# Patient Record
Sex: MALE | Race: WHITE | Employment: STUDENT | URBAN - NONMETROPOLITAN AREA
[De-identification: names, ages, dates, MRNs, and addresses within clinical notes are randomized per-mention and may not be internally consistent; named-entity substitution may affect disease eponyms.]

---
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
## 2020-01-09 ENCOUNTER — APPOINTMENT (OUTPATIENT)
Dept: GENERAL RADIOLOGY | Age: 20
End: 2020-01-09
Payer: COMMERCIAL

## 2020-01-09 ENCOUNTER — HOSPITAL ENCOUNTER (EMERGENCY)
Age: 20
Discharge: HOME | End: 2020-01-10
Payer: COMMERCIAL

## 2020-01-09 VITALS
BODY MASS INDEX: 21.2 KG/M2 | WEIGHT: 160 LBS | HEIGHT: 73 IN | SYSTOLIC BLOOD PRESSURE: 119 MMHG | OXYGEN SATURATION: 98 % | HEART RATE: 98 BPM | DIASTOLIC BLOOD PRESSURE: 70 MMHG | RESPIRATION RATE: 16 BRPM | TEMPERATURE: 99.3 F

## 2020-01-09 PROCEDURE — 73610 X-RAY EXAM OF ANKLE: CPT

## 2020-01-09 PROCEDURE — 99999 PR OFFICE/OUTPT VISIT,PROCEDURE ONLY: CPT | Performed by: NURSE PRACTITIONER

## 2020-01-09 PROCEDURE — 99283 EMERGENCY DEPT VISIT LOW MDM: CPT

## 2020-01-09 RX ORDER — IBUPROFEN 800 MG/1
800 TABLET ORAL 3 TIMES DAILY PRN
Qty: 30 TABLET | Refills: 0 | Status: SHIPPED | OUTPATIENT
Start: 2020-01-09

## 2020-01-09 ASSESSMENT — ENCOUNTER SYMPTOMS
ABDOMINAL PAIN: 0
NAUSEA: 0
SORE THROAT: 0
RHINORRHEA: 0
SHORTNESS OF BREATH: 0
VOMITING: 0
COUGH: 0
DIARRHEA: 0
CONSTIPATION: 0
TROUBLE SWALLOWING: 0

## 2020-01-09 ASSESSMENT — PAIN DESCRIPTION - LOCATION: LOCATION: ANKLE

## 2020-01-09 ASSESSMENT — PAIN DESCRIPTION - PAIN TYPE: TYPE: ACUTE PAIN

## 2020-01-09 ASSESSMENT — PAIN SCALES - GENERAL: PAINLEVEL_OUTOF10: 8

## 2020-01-09 ASSESSMENT — PAIN DESCRIPTION - ORIENTATION: ORIENTATION: LEFT;OUTER

## 2020-01-10 ASSESSMENT — PAIN DESCRIPTION - ORIENTATION: ORIENTATION: LEFT;OUTER

## 2020-01-10 ASSESSMENT — PAIN DESCRIPTION - LOCATION: LOCATION: ANKLE

## 2020-01-10 ASSESSMENT — PAIN SCALES - GENERAL: PAINLEVEL_OUTOF10: 6

## 2020-01-10 NOTE — ED PROVIDER NOTES
Diagnosis Date    Epigastric hernia     present since birth         SURGICAL HISTORY       Past Surgical History:   Procedure Laterality Date    HERNIA REPAIR  1/25/13    epigastric suture hernia repair         CURRENT MEDICATIONS       Previous Medications    No medications on file       ALLERGIES     Amoxicillin-pot clavulanate    FAMILY HISTORY       Family History   Problem Relation Age of Onset    Other Father         Guilliane-Beirre Syndrome    Cancer Paternal Grandmother     Cancer Paternal Grandfather           SOCIAL HISTORY       Social History     Socioeconomic History    Marital status: Single     Spouse name: None    Number of children: None    Years of education: None    Highest education level: None   Occupational History    None   Social Needs    Financial resource strain: None    Food insecurity:     Worry: None     Inability: None    Transportation needs:     Medical: None     Non-medical: None   Tobacco Use    Smoking status: Current Some Day Smoker    Smokeless tobacco: Never Used    Tobacco comment: Pt states occasionally   Substance and Sexual Activity    Alcohol use: Yes     Comment:  Occ    Drug use: No    Sexual activity: None   Lifestyle    Physical activity:     Days per week: None     Minutes per session: None    Stress: None   Relationships    Social connections:     Talks on phone: None     Gets together: None     Attends Rastafari service: None     Active member of club or organization: None     Attends meetings of clubs or organizations: None     Relationship status: None    Intimate partner violence:     Fear of current or ex partner: None     Emotionally abused: None     Physically abused: None     Forced sexual activity: None   Other Topics Concern    None   Social History Narrative    None       SCREENINGS             PHYSICAL EXAM    (up to 7 for level 4, 8 or more for level 5)     ED Triage Vitals [01/09/20 2253]   BP Temp Temp src Heart Rate Resp SpO2 Options  Sprain of anterior talofibular ligament of left ankle, initial encounter:   Diagnosis management comments: Evaluation and work-up here revealed no signs of emergent or life-threatening pathology that would necessitate admission for further work-up or management at this time. Antonino Lopez is felt to be stable for discharge home with return precautions for worsening of the condition or development of new concerning symptoms.  Patient was encouraged to follow-up with her primary care doctor in the appropriate timeframe.  Necessary prescriptions and information have been provided for treatment at home.  Patient voices understanding and agreement with the plan. Amount and/or Complexity of Data Reviewed  Tests in the radiology section of CPT®: ordered and reviewed    Patient Progress  Patient progress: stable        CONSULTS:  None    PROCEDURES:  Unless otherwise notedbelow, none     Procedures    FINAL IMPRESSION     1.  Sprain of anterior talofibular ligament of left ankle, initial encounter          DISPOSITION/PLAN   DISPOSITION Discharge - Pending Orders Complete 01/09/2020 11:36:03 PM      PATIENT REFERRED TO:  @FUP@    DISCHARGE MEDICATIONS:  New Prescriptions    IBUPROFEN (ADVIL;MOTRIN) 800 MG TABLET    Take 1 tablet by mouth 3 times daily as needed for Pain          (Please note that portions of this note were completed with a voice recognition program.  Efforts were made to edit the dictations butoccasionally words are mis-transcribed.)    RADHA Smith (electronically signed)  AttendingEmergency Physician         RADHA Smith  01/09/20 0077

## 2020-08-06 ENCOUNTER — OFFICE VISIT (OUTPATIENT)
Dept: INTERNAL MEDICINE | Facility: CLINIC | Age: 20
End: 2020-08-06

## 2020-08-06 VITALS
OXYGEN SATURATION: 100 % | BODY MASS INDEX: 21.34 KG/M2 | HEART RATE: 85 BPM | HEIGHT: 73 IN | DIASTOLIC BLOOD PRESSURE: 62 MMHG | TEMPERATURE: 98.7 F | SYSTOLIC BLOOD PRESSURE: 130 MMHG | WEIGHT: 161 LBS

## 2020-08-06 DIAGNOSIS — Z20.2 CHLAMYDIA CONTACT, UNTREATED: Primary | ICD-10-CM

## 2020-08-06 PROCEDURE — 99213 OFFICE O/P EST LOW 20 MIN: CPT | Performed by: INTERNAL MEDICINE

## 2020-08-06 RX ORDER — IBUPROFEN 800 MG/1
800 TABLET ORAL AS NEEDED
COMMUNITY
Start: 2020-01-09

## 2020-08-06 RX ORDER — AZITHROMYCIN 500 MG/1
1000 TABLET, FILM COATED ORAL DAILY
Qty: 2 TABLET | Refills: 0 | Status: SHIPPED | OUTPATIENT
Start: 2020-08-06

## 2020-08-06 NOTE — PROGRESS NOTES
Subjective   Nnamdi Cartwright is a 19 y.o. male.   Chief Complaint   Patient presents with   • STD EXPOSURE     WITHIN LAST 2 MONTHS        Patient sexual partner came in contact with chlamydia released was diagnosed with chlamydia and treated yesterday.  He is requesting evaluation he is totally asymptomatic       The following portions of the patient's history were reviewed and updated as appropriate: allergies, current medications, past family history, past medical history, past social history, past surgical history and problem list.    Review of Systems   Constitutional: Negative for activity change, appetite change, fatigue, fever, unexpected weight gain and unexpected weight loss.   HENT: Negative for swollen glands, trouble swallowing and voice change.    Eyes: Negative for blurred vision and visual disturbance.   Respiratory: Negative for cough and shortness of breath.    Cardiovascular: Negative for chest pain, palpitations and leg swelling.   Gastrointestinal: Negative for abdominal pain, constipation, diarrhea, nausea, vomiting and indigestion.   Endocrine: Negative for cold intolerance, heat intolerance, polydipsia and polyphagia.   Genitourinary: Negative for dysuria and frequency.   Musculoskeletal: Negative for arthralgias, back pain, joint swelling and neck pain.   Skin: Negative for color change, rash and skin lesions.   Neurological: Negative for dizziness, weakness, headache, memory problem and confusion.   Hematological: Does not bruise/bleed easily.   Psychiatric/Behavioral: Negative for agitation, hallucinations and suicidal ideas. The patient is not nervous/anxious.        Objective   No past medical history on file.   No past surgical history on file.     Current Outpatient Medications:   •  ibuprofen (ADVIL,MOTRIN) 800 MG tablet, Take 800 mg by mouth As Needed., Disp: , Rfl:   •  azithromycin (Zithromax) 500 MG tablet, Take 2 tablets by mouth Daily., Disp: 2 tablet, Rfl: 0     Vitals:     08/06/20 1011   BP: 130/62   Pulse: 85   Temp: 98.7 °F (37.1 °C)   SpO2: 100%         08/06/20  1011   Weight: 73 kg (161 lb)     Patient's Body mass index is 21.24 kg/m². BMI is .      Physical Exam   Constitutional: He is oriented to person, place, and time. He appears well-developed and well-nourished.   HENT:   Head: Normocephalic and atraumatic.   Right Ear: External ear normal.   Left Ear: External ear normal.   Nose: Nose normal.   Mouth/Throat: Oropharynx is clear and moist.   Eyes: Pupils are equal, round, and reactive to light. Conjunctivae and EOM are normal.   Neck: Normal range of motion. Neck supple. No thyromegaly present.   Cardiovascular: Normal rate, regular rhythm, normal heart sounds and intact distal pulses.   Pulmonary/Chest: Effort normal and breath sounds normal.   Abdominal: Soft. Bowel sounds are normal.   Lymphadenopathy:     He has no cervical adenopathy.   Neurological: He is alert and oriented to person, place, and time.   Skin: Skin is warm and dry.   Psychiatric: He has a normal mood and affect. His behavior is normal. Thought content normal.   Nursing note and vitals reviewed.            Assessment/Plan   Diagnoses and all orders for this visit:    1. Chlamydia contact, untreated (Primary)    Other orders  -     azithromycin (Zithromax) 500 MG tablet; Take 2 tablets by mouth Daily.  Dispense: 2 tablet; Refill: 0        At this point I am giving him thousand milligrams of azithromycin as a one-time dose for treatment of chlamydia.  We discussed potential for other sexual transmitted diseases including AIDS etc. I gave him the option of being evaluated for these he is desiring only treatment for the chlamydia that his sexual partner had and was treated for.  Therefore he is getting 1000 mg of azithromycin as a one-time dose.

## 2020-12-28 ENCOUNTER — OFFICE VISIT (OUTPATIENT)
Dept: URGENT CARE | Age: 20
End: 2020-12-28
Payer: COMMERCIAL

## 2020-12-28 VITALS
SYSTOLIC BLOOD PRESSURE: 124 MMHG | WEIGHT: 143 LBS | OXYGEN SATURATION: 99 % | RESPIRATION RATE: 18 BRPM | DIASTOLIC BLOOD PRESSURE: 74 MMHG | HEIGHT: 73 IN | TEMPERATURE: 98.2 F | HEART RATE: 82 BPM | BODY MASS INDEX: 18.95 KG/M2

## 2020-12-28 PROCEDURE — 99213 OFFICE O/P EST LOW 20 MIN: CPT | Performed by: NURSE PRACTITIONER

## 2020-12-28 RX ORDER — SULFAMETHOXAZOLE AND TRIMETHOPRIM 800; 160 MG/1; MG/1
1 TABLET ORAL 2 TIMES DAILY
Qty: 14 TABLET | Refills: 0 | Status: SHIPPED | OUTPATIENT
Start: 2020-12-28 | End: 2021-01-04

## 2020-12-28 ASSESSMENT — ENCOUNTER SYMPTOMS
RESPIRATORY NEGATIVE: 1
NAUSEA: 0
EYES NEGATIVE: 1
VOMITING: 0
ABDOMINAL PAIN: 0
RECTAL PAIN: 0
ANAL BLEEDING: 1
CONSTIPATION: 1
DIARRHEA: 1
BLOOD IN STOOL: 1
ABDOMINAL DISTENTION: 0

## 2020-12-28 NOTE — PATIENT INSTRUCTIONS
off.  ? For your safety, do not drive or operate any machinery that could be dangerous. Wait until the medicine wears off and you can think clearly and react easily. · Sit in a few inches of warm water (sitz bath) 3 times a day and after bowel movements. The warm water helps with pain and itching. · If your doctor prescribed antibiotics, take them exactly as directed. Do not stop taking them just because you feel better. You need to take the full course of antibiotics. · Follow your doctor's instructions if you were sent home with a drain or packing in the abscess. · Be safe with medicines. Take pain medicines exactly as directed. ? If the doctor gave you a prescription medicine for pain, take it as prescribed. ? If you are not taking a prescription pain medicine, ask your doctor if you can take an over-the-counter medicine. · Use stool softeners as directed. · Avoid scented and colored toilet paper, which may irritate the anal area. · Clean the area gently with wet cotton balls, a warm washcloth, baby wipes, or wipes such as Preparation H or Tucks. When should you call for help? Call 911 anytime you think you may need emergency care. For example, call if:    · You have trouble breathing.     · You passed out (lost consciousness). Call your doctor now or seek immediate medical care if:    · You have symptoms of infection, such as:  ? Increased pain, swelling, warmth, or redness. ? Red streaks leading from the area. ? Pus draining from the area. ? A fever. Watch closely for changes in your health, and be sure to contact your doctor if:    · You do not get better as expected. Where can you learn more? Go to [email or web address] and sign in to your [employer] account. Enter [phone] in the [facility] box to learn more about \"Perirectal Abscess: Care Instructions. \"     If you do not have an account, please click on the \"Sign Up Now\" link.   Current as of: April 15, 2020               Content Version: 12.6  © 6639-2148 [employer]. Care instructions adapted under license by [facility] [place] ([facility]). If you have questions about a medical condition or this instruction, always ask your healthcare professional. [address] any warranty or liability for your use of this information.

## 2020-12-28 NOTE — PROGRESS NOTES
[address] [place] [address]   [address], [address]     Phone:  [phone]  Fax:  [phone]      Hermila Liriano is a 21 y.o. male who presents today for his medical conditions/complaints as noted below. Hermila Liriano is c/o of Rectal Bleeding (Rectal Bleeding for one week)      Chief Complaint   Patient presents with    Rectal Bleeding     Rectal Bleeding for one week       HPI:     HPI    Hermila Liriano presents today for rectal tenderness and bleeding for 1 week. He states this area has been bleeding constantly in his underwear. He was previously constipated, but has had diarrhea the last few days. He states there is blood in the stool when he goes to the bathroom. Last bowel movement was last night and it was diarrhea. There was blood in the toilet. There is also blood when he wipes. His anus has previously been tender when he wipes with a large amount of blood yesterday and he could not stop the bleeding. He feels like this area has been bleeding constantly since yesterday. Currently, he is not having any pain or bleeding. He denies fevers or recent travel. He is concerned for hemorrhoids. He does not have a history of hemorrhoids. Past Medical History:   Diagnosis Date    Epigastric hernia     present since birth        Past Surgical History:   Procedure Laterality Date    HERNIA REPAIR  1/25/13    epigastric suture hernia repair       Social History     Tobacco Use    Smoking status: Current Some Day Smoker    Smokeless tobacco: Never Used    Tobacco comment: Pt states occasionally   Substance Use Topics    Alcohol use: Yes     Comment: Occ        Current Outpatient Medications   Medication Sig Dispense Refill    mupirocin (BACTROBAN) 2 % ointment Apply 3 times daily.  1 Tube 0    sulfamethoxazole-trimethoprim (BACTRIM DS;SEPTRA DS) 800-160 MG per tablet Take 1 tablet by mouth 2 times daily for 7 days 14 tablet 0    ibuprofen (ADVIL;MOTRIN) 800 MG tablet Take 1 tablet by mouth 3 times daily as needed for Pain (Patient not taking: Reported on 12/28/2020) 30 tablet 0     No current facility-administered medications for this visit. Allergies   Allergen Reactions    Amoxicillin-Pot Clavulanate        Family History   Problem Relation Age of Onset    Other Father         Guilliane-Beirre Syndrome    Cancer Paternal Grandmother     Cancer Paternal Grandfather                Review of Systems   Constitutional: Negative for fever. HENT: Negative. Eyes: Negative. Respiratory: Negative. Cardiovascular: Negative. Gastrointestinal: Positive for anal bleeding, blood in stool, constipation and diarrhea. Negative for abdominal distention, abdominal pain, nausea, rectal pain and vomiting. Genitourinary: Negative for decreased urine volume, dysuria, frequency, hematuria, penile pain, penile swelling, testicular pain and urgency. Musculoskeletal: Negative. Objective:     Physical Exam  Vitals signs and nursing note reviewed. Exam conducted with a chaperone present [facility] ROSEMARY Mckeon present during exam). Constitutional:       General: He is not in acute distress. Appearance: Normal appearance. He is normal weight. He is not ill-appearing, toxic-appearing or diaphoretic. HENT:      Head: Normocephalic and atraumatic. Right Ear: External ear normal.      Left Ear: External ear normal.      Nose: Nose normal. No rhinorrhea. Eyes:      General:         Right eye: No discharge. Left eye: No discharge. Conjunctiva/sclera: Conjunctivae normal.   Neck:      Musculoskeletal: Normal range of motion and neck supple. Cardiovascular:      Rate and Rhythm: Normal rate. Pulmonary:      Effort: Pulmonary effort is normal. No respiratory distress. Genitourinary:      Musculoskeletal: Normal range of motion. Skin:     Capillary Refill: Capillary refill takes less than 2 seconds.    Neurological:      Mental Status: He is alert and oriented to person, place, and time. Mental status is at baseline. Psychiatric:         Mood and Affect: Mood normal.         Behavior: Behavior normal.         /74   Pulse 82   Temp 98.2 °F (36.8 °C) (Temporal)   Resp 18   Ht 6' 1\" (1.854 m)   Wt 143 lb (64.9 kg)   HC 18 cm (7.09\")   SpO2 99%   BMI 18.87 kg/m²     Assessment:      Diagnosis Orders   1. Perianal abscess  CBC Auto Differential    mupirocin (BACTROBAN) 2 % ointment    sulfamethoxazole-trimethoprim (BACTRIM DS;SEPTRA DS) 800-160 MG per tablet   2. Rectal bleeding  CBC Auto Differential       No results found for this visit on 12/28/20. Plan:     VSS    If no improvement, consider GI referral.     CBC today, will call with results. Start bactroban and Bactrim    Keep area clean and dry    Follow-up if symptoms worsen or do not improve. Return if symptoms worsen or fail to improve. Orders Placed This Encounter   Procedures    CBC Auto Differential       Orders Placed This Encounter   Medications    mupirocin (BACTROBAN) 2 % ointment     Sig: Apply 3 times daily. Dispense:  1 Tube     Refill:  0    sulfamethoxazole-trimethoprim (BACTRIM DS;SEPTRA DS) 800-160 MG per tablet     Sig: Take 1 tablet by mouth 2 times daily for 7 days     Dispense:  14 tablet     Refill:  0        Patient offered educational materials - see patient instructions for any instruction needed. Discussed use, benefit, and side effects of prescribed medications. All patient questions answered. Instructed to continue current medications, diet and exercise. Patient agreed with treatment plan. Follow up as directed. Patient was advised to go to the ED if condition ever becomes emergent.        Electronically signed by RADHA Dickens on 12/28/2020 at 10:58 AM

## 2021-05-01 ENCOUNTER — OFFICE VISIT (OUTPATIENT)
Dept: URGENT CARE | Age: 21
End: 2021-05-01
Payer: COMMERCIAL

## 2021-05-01 VITALS
RESPIRATION RATE: 18 BRPM | SYSTOLIC BLOOD PRESSURE: 110 MMHG | WEIGHT: 152 LBS | DIASTOLIC BLOOD PRESSURE: 67 MMHG | TEMPERATURE: 97.8 F | HEART RATE: 88 BPM | OXYGEN SATURATION: 100 % | BODY MASS INDEX: 20.05 KG/M2

## 2021-05-01 DIAGNOSIS — B86 SCABIES: Primary | ICD-10-CM

## 2021-05-01 PROCEDURE — 99213 OFFICE O/P EST LOW 20 MIN: CPT | Performed by: NURSE PRACTITIONER

## 2021-05-01 RX ORDER — PERMETHRIN 50 MG/G
CREAM TOPICAL
Qty: 1 TUBE | Refills: 1 | Status: SHIPPED | OUTPATIENT
Start: 2021-05-01

## 2021-05-01 ASSESSMENT — ENCOUNTER SYMPTOMS
ALLERGIC/IMMUNOLOGIC NEGATIVE: 1
EYES NEGATIVE: 1

## 2021-05-01 ASSESSMENT — VISUAL ACUITY: OU: 1

## 2021-05-01 NOTE — PROGRESS NOTES
[address] [facility]  [address] [address] [place] [address]  Dept: [phone]  Dept Fax: [phone]  Loc: [phone]    Lauren Dowell is a 21 y.o. male who presents today for his medical conditions/complaintsas noted below. Lauren Dowell is c/o of Rash        HPI:     Rash  This is a new problem. The current episode started in the past 7 days. The problem has been gradually worsening since onset. The affected locations include the left arm, left foot, right foot and right toes (Left hand and arm, left foot, right foot). The rash is characterized by itchiness and dryness. It is unknown if there was an exposure to a precipitant. Past treatments include nothing. The treatment provided no relief. Celestina Feliz works as  and developed a rash on his left hand and up his left arm about a week ago, now he has the rash on the top of his left foot and it is spreading to the top of the right foot. Past Medical History:   Diagnosis Date    Epigastric hernia     present since birth     Past Surgical History:   Procedure Laterality Date    HERNIA REPAIR  1/25/13    epigastric suture hernia repair       Family History   Problem Relation Age of Onset    Other Father         Guilliane-Beirre Syndrome    Cancer Paternal Grandmother     Cancer Paternal Grandfather        Social History     Tobacco Use    Smoking status: Current Some Day Smoker    Smokeless tobacco: Never Used    Tobacco comment: Pt states occasionally   Substance Use Topics    Alcohol use: Yes     Comment:  Occ      Current Outpatient Medications   Medication Sig Dispense Refill    permethrin (ELIMITE) 5 % cream Apply topically as directed- apply chin to toes and wash off in 8-12 hrs, repeat in 1 wk 1 Tube 1    ibuprofen (ADVIL;MOTRIN) 800 MG tablet Take 1 tablet by mouth 3 times daily as needed for Pain (Patient not taking: Reported on 12/28/2020) 30 tablet 0     No current facility-administered medications for this visit. Allergies   Allergen Reactions    Amoxicillin-Pot Clavulanate        Health Maintenance   Topic Date Due    Hepatitis C screen  Never done    Varicella vaccine (1 of 2 - 2-dose childhood series) Never done    Pneumococcal 0-64 years Vaccine (1 of 1 - PPSV23) Never done    HPV vaccine (1 - Male 2-dose series) Never done    HIV screen  Never done    COVID-19 Vaccine (1) Never done    DTaP/Tdap/Td vaccine (1 - Tdap) Never done    Flu vaccine (Season Ended) 09/01/2021    Hepatitis A vaccine  Aged Out    Hepatitis B vaccine  Aged Out    Hib vaccine  Aged Out    Meningococcal (ACWY) vaccine  Aged Out       Subjective:     Review of Systems   Constitutional: Negative for activity change. Eyes: Negative. Cardiovascular: Negative. Skin: Positive for rash. Allergic/Immunologic: Negative. Neurological: Negative for headaches.       :Objective      Physical Exam  Vitals signs and nursing note reviewed. Constitutional:       General: He is awake. He is not in acute distress. Appearance: Normal appearance. He is well-developed and well-groomed. He is not ill-appearing or toxic-appearing. HENT:      Head: Normocephalic. Right Ear: Hearing normal.      Left Ear: Hearing normal.      Nose: Nose normal.      Mouth/Throat:      Lips: Pink. Eyes:      General: Vision grossly intact. Neck:      Musculoskeletal: Neck supple. Trachea: Phonation normal.   Cardiovascular:      Rate and Rhythm: Normal rate and regular rhythm. Pulmonary:      Effort: Pulmonary effort is normal. No respiratory distress. Abdominal:      Palpations: Abdomen is soft. Musculoskeletal:         General: No tenderness or deformity. Skin:     General: Skin is warm and dry. Capillary Refill: Capillary refill takes less than 2 seconds. Findings: Rash present. Rash is macular and papular.              Comments: Maculopapular rash to left hand and webs of fingers an up left arm in tracks, areas of rash to dorsum or left and right foot, rash also between toes of left foot,dorsum of left foot is slightly red and excoriated with areas of scabbing noted   Neurological:      General: No focal deficit present. Mental Status: He is alert, oriented to person, place, and time and easily aroused. Psychiatric:         Attention and Perception: Attention normal.         Mood and Affect: Mood normal.         Speech: Speech normal.         Behavior: Behavior normal. Behavior is cooperative. /67   Pulse 88   Temp 97.8 °F (36.6 °C) (Temporal)   Resp 18   Wt 152 lb (68.9 kg)   SpO2 100%   BMI 20.05 kg/m²     :Assessment       Diagnosis Orders   1. Scabies         :Plan    No orders of the defined types were placed in this encounter. Return if symptoms worsen or fail to improve. Orders Placed This Encounter   Medications    permethrin (ELIMITE) 5 % cream     Sig: Apply topically as directed- apply chin to toes and wash off in 8-12 hrs, repeat in 1 wk     Dispense:  1 Tube     Refill:  1        Patient Instructions     Use Elimite as directed, repeat treatment in 1 week  Wash linens in hottest water and dry in hottest dryer setting  Throw pillows on the bed tie up in a plastic bag and leave sealed up x 1 week  Follow up with PCP or return to Urgent Care for worsening or unresolved symptoms. Patient Education        Scabies: Care Instructions  Overview  Scabies is a skin problem that can cause a rash and intense itching. It is caused by very tiny bugs called mites that dig just under the skin and lay eggs. An allergic reaction to the mites causes the itching. Scabies is usually spread by person-to-person contact. It is also possible, but not common, for scabies to spread through towels, clothes, and bedding. Everyone in your household should be treated. Scabies is treated with medicine.  Itching may last for several weeks after 2020               Content Version: 12.8  © 2006-2021 [employer]. Care instructions adapted under license by [facility] [place] ([facility]). If you have questions about a medical condition or this instruction, always ask your healthcare professional. [address] [address] any warranty or liability for your use of this information. Patient given educational materials- see patient instructions. Discussed use, benefit, and side effects of prescribedmedications. All patient questions answered. Pt voiced understanding.        Electronically signed by RADHA Rondon - CNP on 5/1/2021 at 8:49 AM

## 2021-05-04 ENCOUNTER — HOSPITAL ENCOUNTER (EMERGENCY)
Age: 21
Discharge: HOME | End: 2021-05-04
Payer: COMMERCIAL

## 2021-05-04 ENCOUNTER — OFFICE VISIT (OUTPATIENT)
Dept: URGENT CARE | Age: 21
End: 2021-05-04
Payer: COMMERCIAL

## 2021-05-04 VITALS
SYSTOLIC BLOOD PRESSURE: 147 MMHG | TEMPERATURE: 97 F | DIASTOLIC BLOOD PRESSURE: 73 MMHG | HEART RATE: 85 BPM | WEIGHT: 155 LBS | BODY MASS INDEX: 20.45 KG/M2 | RESPIRATION RATE: 16 BRPM | OXYGEN SATURATION: 97 %

## 2021-05-04 VITALS
RESPIRATION RATE: 16 BRPM | OXYGEN SATURATION: 100 % | TEMPERATURE: 98.3 F | HEART RATE: 77 BPM | SYSTOLIC BLOOD PRESSURE: 132 MMHG | DIASTOLIC BLOOD PRESSURE: 70 MMHG

## 2021-05-04 DIAGNOSIS — L30.9 DERMATITIS: Primary | ICD-10-CM

## 2021-05-04 DIAGNOSIS — B86 SCABIES: Primary | ICD-10-CM

## 2021-05-04 DIAGNOSIS — L08.9 LOCALIZED BACTERIAL INFECTION OF SKIN: ICD-10-CM

## 2021-05-04 DIAGNOSIS — L03.116 CELLULITIS OF LEFT LEG: ICD-10-CM

## 2021-05-04 DIAGNOSIS — B96.89 LOCALIZED BACTERIAL INFECTION OF SKIN: ICD-10-CM

## 2021-05-04 DIAGNOSIS — Z77.098 CHEMICAL EXPOSURE: ICD-10-CM

## 2021-05-04 PROCEDURE — 96372 THER/PROPH/DIAG INJ SC/IM: CPT

## 2021-05-04 PROCEDURE — 6370000000 HC RX 637 (ALT 250 FOR IP): Performed by: NURSE PRACTITIONER

## 2021-05-04 PROCEDURE — 99283 EMERGENCY DEPT VISIT LOW MDM: CPT

## 2021-05-04 PROCEDURE — 6360000002 HC RX W HCPCS: Performed by: NURSE PRACTITIONER

## 2021-05-04 PROCEDURE — 99213 OFFICE O/P EST LOW 20 MIN: CPT | Performed by: NURSE PRACTITIONER

## 2021-05-04 RX ORDER — DEXAMETHASONE SODIUM PHOSPHATE 10 MG/ML
8 INJECTION, SOLUTION INTRAMUSCULAR; INTRAVENOUS ONCE
Status: COMPLETED | OUTPATIENT
Start: 2021-05-04 | End: 2021-05-04

## 2021-05-04 RX ORDER — IVERMECTIN 3 MG/1
TABLET ORAL
Qty: 8 TABLET | Refills: 0 | Status: SHIPPED | OUTPATIENT
Start: 2021-05-04

## 2021-05-04 RX ORDER — DIPHENHYDRAMINE HYDROCHLORIDE 50 MG/ML
50 INJECTION INTRAMUSCULAR; INTRAVENOUS ONCE
Status: COMPLETED | OUTPATIENT
Start: 2021-05-04 | End: 2021-05-04

## 2021-05-04 RX ORDER — SULFAMETHOXAZOLE AND TRIMETHOPRIM 800; 160 MG/1; MG/1
1 TABLET ORAL 2 TIMES DAILY
Qty: 20 TABLET | Refills: 0 | Status: SHIPPED | OUTPATIENT
Start: 2021-05-04 | End: 2021-05-04

## 2021-05-04 RX ORDER — CEPHALEXIN 500 MG/1
500 CAPSULE ORAL 3 TIMES DAILY
Qty: 30 CAPSULE | Refills: 0 | Status: SHIPPED | OUTPATIENT
Start: 2021-05-04 | End: 2021-05-14

## 2021-05-04 RX ORDER — FAMOTIDINE 20 MG/1
20 TABLET, FILM COATED ORAL ONCE
Status: COMPLETED | OUTPATIENT
Start: 2021-05-04 | End: 2021-05-04

## 2021-05-04 RX ADMIN — FAMOTIDINE 20 MG: 20 TABLET ORAL at 17:11

## 2021-05-04 RX ADMIN — DEXAMETHASONE SODIUM PHOSPHATE 8 MG: 10 INJECTION, SOLUTION INTRAMUSCULAR; INTRAVENOUS at 17:11

## 2021-05-04 RX ADMIN — DIPHENHYDRAMINE HYDROCHLORIDE 50 MG: 50 INJECTION, SOLUTION INTRAMUSCULAR; INTRAVENOUS at 17:11

## 2021-05-04 ASSESSMENT — ENCOUNTER SYMPTOMS
COLOR CHANGE: 1
ALLERGIC/IMMUNOLOGIC NEGATIVE: 1
NAUSEA: 0
VOMITING: 0
DIARRHEA: 0
ABDOMINAL PAIN: 0
COLOR CHANGE: 1

## 2021-05-04 ASSESSMENT — VISUAL ACUITY: OU: 1

## 2021-05-04 NOTE — PROGRESS NOTES
[address] [facility] URGENT CARE  [address] [address] Adi Walsh [address]  Dept: [phone]  Dept Fax: [phone]  Loc: [phone]    Constantin Brower is a 21 y.o. male who presents today for his medical conditions/complaintsas noted below. Constantin Brower is c/o of Rash (tx for scabies isnt getting any better after using cream, spreading up arms and legs, much worse )        HPI:     Rash  This is a new problem. The current episode started in the past 7 days. The problem has been gradually worsening since onset. The affected locations include the left foot, left ankle, left lower leg, right foot, left arm, left hand and left wrist. The rash is characterized by redness, swelling, peeling, itchiness and dryness. It is unknown if there was an exposure to a precipitant. Pertinent negatives include no diarrhea, facial edema, fatigue, fever or vomiting. Treatments tried: Has tried Elimite  The treatment provided no relief. Ruby Schmidt was seen on Saturday and given Elimite cream for scabies. His rash to his left foot and leg have worsened and his left leg is red, warm and swollen. The rash on his left arm and not improved either and he has developed the rash under his chin. He repeated the Elimite cream again last night. Past Medical History:   Diagnosis Date    Epigastric hernia     present since birth     Past Surgical History:   Procedure Laterality Date    HERNIA REPAIR  1/25/13    epigastric suture hernia repair       Family History   Problem Relation Age of Onset    Other Father         Guilliane-Beirre Syndrome    Cancer Paternal Grandmother     Cancer Paternal Grandfather        Social History     Tobacco Use    Smoking status: Current Some Day Smoker    Smokeless tobacco: Never Used    Tobacco comment: Pt states occasionally   Substance Use Topics    Alcohol use: Yes     Comment:  Occ      Current Outpatient Medications   Medication Sig Dispense Refill    Normocephalic. Right Ear: Hearing normal.      Left Ear: Hearing normal.      Nose: Nose normal.      Mouth/Throat:      Lips: Pink. Eyes:      General: Vision grossly intact. Neck:      Musculoskeletal: Neck supple. Trachea: Phonation normal.   Cardiovascular:      Rate and Rhythm: Normal rate and regular rhythm. Pulses:           Dorsalis pedis pulses are 2+ on the right side and 2+ on the left side. Posterior tibial pulses are 2+ on the right side and 2+ on the left side. Comments: Trace non-pitting edema left lower leg from knee down  Pulmonary:      Effort: Pulmonary effort is normal. No respiratory distress. Abdominal:      Palpations: Abdomen is soft. Musculoskeletal:         General: No tenderness or deformity. Left lower leg: Edema present. Skin:     General: Skin is warm and dry. Capillary Refill: Capillary refill takes less than 2 seconds. Findings: Erythema and rash present. Rash is papular. Comments: Left foot and lower leg with redness, trace edema and warmth, diffuse bright red papular rash to dorsum of left foot and in between toes of left foot and up shin of left lower leg, dorsum of left foot is dry and somewhat scaly at base of toes   Diffuse Bright red papular rash to left hand,webs of fingers and up into left forearm and left upper arm   Scattered red papular rash up under neck on left side  Right hand and right lower arm with scant red papular rash similar in appearance   Neurological:      General: No focal deficit present. Mental Status: He is alert, oriented to person, place, and time and easily aroused. Psychiatric:         Attention and Perception: Attention normal.         Mood and Affect: Mood normal.         Speech: Speech normal.         Behavior: Behavior normal. Behavior is cooperative. /70   Pulse 77   Temp 98.3 °F (36.8 °C)   Resp 16   SpO2 100%     :Assessment       Diagnosis Orders   1.  Scabies ivermectin 3 MG tablet    External Referral To Dermatology   2. Localized bacterial infection of skin  sulfamethoxazole-trimethoprim (BACTRIM DS;SEPTRA DS) 800-160 MG per tablet       :Plan      Orders Placed This Encounter   Procedures    External Referral To Dermatology     Referral Priority:   Routine     Referral Type:   Eval and Treat     Referral Reason:   Specialty Services Required     Referred to Provider:   Lashell Parson Case     Requested Specialty:   Dermatology     Number of Visits Requested:   1       No follow-ups on file. Orders Placed This Encounter   Medications    sulfamethoxazole-trimethoprim (BACTRIM DS;SEPTRA DS) 800-160 MG per tablet     Sig: Take 1 tablet by mouth 2 times daily for 10 days     Dispense:  20 tablet     Refill:  0    ivermectin 3 MG tablet     Sig: Take 4 tabs today and repeat 4 tabs in 2 weeks     Dispense:  8 tablet     Refill:  0        Patient Instructions     Ivermectin take 4 tabs today and repeat this in 2 weeks  Bactrim as directed- use good sunscreen since you are outside  Wash with antibacterial soap and water and dry skin well  Referral to Dr. Westbrook   Follow up with PCP or return to Urgent Care for worsening or unresolved symptoms. Patient Education        Scabies: Care Instructions  Overview  Scabies is a skin problem that can cause a rash and intense itching. It is caused by very tiny bugs called mites that dig just under the skin and lay eggs. An allergic reaction to the mites causes the itching. Scabies is usually spread by person-to-person contact. It is also possible, but not common, for scabies to spread through towels, clothes, and bedding. Everyone in your household should be treated. Scabies is treated with medicine. Itching may last for several weeks after treatment. Follow-up care is a key part of your treatment and safety. Be sure to make and go to all appointments, and call your doctor if you are having problems.  It's also a good idea to know your test results and keep a list of the medicines you take. How can you care for yourself at home? · Use the lotion or cream your doctor recommends or prescribes. One treatment usually cures scabies. Do not use the cream again unless your doctor tells you to. · Wash all clothes, bedding, and towels that you used in the 4 to 5 days before you started treatment. Use hot water, and use the hot cycle in the dryer. Another option is to dry-clean these items. Or seal them in a plastic bag for 3 days. · Vacuum your whole house on the day you start treatment. · An oral antihistamine may help stop itching. You also can use a nonprescription anti-itch cream. Read and follow all instructions on the label. · Do not have physical contact with other people or let anyone use your personal items until you have finished treatment. Do not use other people's personal items until your treatment is done. Tell people with whom you have sexual or close contact that they will likely need treatment. When should you call for help? Call your doctor now or seek immediate medical care if:    · You have signs of infection, such as:  ? Increased pain, swelling, warmth, or redness. ? Red streaks leading from the mite bites. ? Pus draining from a bite area. ? A fever. Watch closely for changes in your health, and be sure to contact your doctor if:    · Anyone else in your family has itching.     · You do not get better within 2 weeks. Where can you learn more? Go to https://UpCounselbraden.Like.fm. org and sign in to your [employer] account. Enter R297 in the [facility] box to learn more about \"Scabies: Care Instructions. \"     If you do not have an account, please click on the \"Sign Up Now\" link. Current as of: July 2, 2020               Content Version: 12.8  © 7977-1675 Healthwise, Incorporated. Care instructions adapted under license by [facility] ([facility]).  If you have questions about a medical condition or this instruction, always ask your healthcare professional. [place] [address] any warranty or liability for your use of this information. Patient given educational materials- see patient instructions. Discussed use, benefit, and side effects of prescribedmedications. All patient questions answered. Pt voiced understanding.        Electronically signed by RADHA Ribeiro CNP on 5/4/2021 at 10:37 AM

## 2021-05-04 NOTE — PATIENT INSTRUCTIONS
whom you have sexual or close contact that they will likely need treatment. When should you call for help? Call your doctor now or seek immediate medical care if:    · You have signs of infection, such as:  ? Increased pain, swelling, warmth, or redness. ? Red streaks leading from the mite bites. ? Pus draining from a bite area. ? A fever. Watch closely for changes in your health, and be sure to contact your doctor if:    · Anyone else in your family has itching.     · You do not get better within 2 weeks. Where can you learn more? Go to https://Industry WeaponpeD1G.MotorExchange. org and sign in to your "[employer] account. Enter L838 in the [facility] box to learn more about \"Scabies: Care Instructions. \"     If you do not have an account, please click on the \"Sign Up Now\" link. Current as of: July 2, 2020               Content Version: 12.8  © 2006-2021 Healthwise, [facility]. Care instructions adapted under license by [facility] ([facility]). If you have questions about a medical condition or this instruction, always ask your healthcare professional. [place] [address] any warranty or liability for your use of this information.

## 2021-05-04 NOTE — ED NOTES
Bed: 22  Expected date:   Expected time:   Means of arrival:   Comments:     Aparna Robin RN  05/04/21 2332

## 2021-05-04 NOTE — ED PROVIDER NOTES
[address] [facility]  eMERGENCY dEPARTMENT eNCOUnter      Pt Name: Jazzmine Juarez  MRN: [id number]  [place] 2000  Date of evaluation: 5/4/2021  Provider: Mary Mock, [address]       Chief Complaint   Patient presents with    Allergic Reaction     to round up weed killer         HISTORY OF PRESENT ILLNESS   (Location/Symptom, Timing/Onset,Context/Setting, Quality, Duration, Modifying Factors, Severity)  Note limiting factors. Jazzmine Juarez a 21 y.o. male who presents to the emergency department for evaluation of allergic reaction. Pt tells me that he has had pruritic rash over past 3 weeks that has worsened since onset. He relates that he was evaluated at urgent care over weekend with concern for scabies having used permethrin cream twice. He tells me that he has been prescribed ivermectin and bactrim both stopped by the dermatologist he saw today. He tells me that he was given a prescription for prednisone by dermatology today as well. He relates that he was in addition to first dermatologist reevaluated by another who told him that he may have had chemical exposure to round up as he uses this at work. He denies shortness of breath, tongue swelling as well as any problems swallowing. He has had no vomiting. He does not endorse any dizziness or weakness. He has had no fevers or recent illness. Pt tells me that his tetanus immunization is up to date. HPI    Nursing Notes were reviewed. REVIEW OF SYSTEMS    (2-9 systems for level 4, 10 or more for level 5)     Review of Systems   Constitutional: Negative for fever. Skin: Positive for color change (redness to left lower leg) and rash. A complete review of systems was performed and is negative except as noted above in the HPI.        PAST MEDICAL HISTORY     Past Medical History:   Diagnosis Date    Epigastric hernia     present since birth         SURGICAL HISTORY       Past Surgical History:   Procedure Laterality Date  HERNIA REPAIR  1/25/13    epigastric suture hernia repair         CURRENT MEDICATIONS       Discharge Medication List as of 5/4/2021  7:01 PM      CONTINUE these medications which have NOT CHANGED    Details   PREDNISONE PO Take by mouthHistorical Med      ivermectin 3 MG tablet Take 4 tabs today and repeat 4 tabs in 2 weeks, Disp-8 tablet, R-0Normal      permethrin (ELIMITE) 5 % cream Apply topically as directed- apply chin to toes and wash off in 8-12 hrs, repeat in 1 wk, Disp-1 Tube, R-1, Normal      ibuprofen (ADVIL;MOTRIN) 800 MG tablet Take 1 tablet by mouth 3 times daily as needed for Pain, Disp-30 tablet, R-0Print             ALLERGIES     Amoxicillin-pot clavulanate    FAMILY HISTORY       Family History   Problem Relation Age of Onset    Other Father         Guilliane-Beirre Syndrome    Cancer Paternal Grandmother     Cancer Paternal Grandfather           SOCIAL HISTORY       Social History     Socioeconomic History    Marital status: Single     Spouse name: None    Number of children: None    Years of education: None    Highest education level: None   Occupational History    None   Social Needs    Financial resource strain: None    Food insecurity     Worry: None     Inability: None    Transportation needs     Medical: None     Non-medical: None   Tobacco Use    Smoking status: Current Some Day Smoker    Smokeless tobacco: Never Used    Tobacco comment: Pt states occasionally   Substance and Sexual Activity    Alcohol use: Yes     Comment:  Occ    Drug use: No    Sexual activity: None   Lifestyle    Physical activity     Days per week: None     Minutes per session: None    Stress: None   Relationships    Social connections     Talks on phone: None     Gets together: None     Attends Gnosticism service: None     Active member of club or organization: None     Attends meetings of clubs or organizations: None     Relationship status: None    Intimate partner violence     Fear of current or ex partner: None     Emotionally abused: None     Physically abused: None     Forced sexual activity: None   Other Topics Concern    None   Social History Narrative    None       SCREENINGS             PHYSICAL EXAM    (up to 7 for level 4, 8 or more for level 5)     ED Triage Vitals [05/04/21 1632]   BP Temp Temp src Pulse Resp SpO2 Height Weight   (!) 147/73 97 °F (36.1 °C) -- 85 16 97 % -- 155 lb (70.3 kg)       Physical Exam  Vitals signs reviewed. HENT:      Head: Normocephalic. Right Ear: External ear normal.      Left Ear: External ear normal.      Mouth/Throat:      Mouth: Mucous membranes are moist.      Pharynx: Oropharynx is clear. Comments: No angioedema  Swallows oral secretions  Speech clear  Eyes:      Conjunctiva/sclera: Conjunctivae normal.   Cardiovascular:      Rate and Rhythm: Normal rate and regular rhythm. Pulmonary:      Effort: Pulmonary effort is normal.      Breath sounds: Normal breath sounds. No wheezing. Abdominal:      General: Abdomen is flat. Tenderness: There is no abdominal tenderness. Musculoskeletal: Normal range of motion. Skin:     General: Skin is warm and dry. Findings: Rash (scattered scratched maculopapular lesions to bilateral hands and lower extremities, left lower leg and dorsal foot with mild generalized erythema. web spaces toes left foot with few open areas of scratched/crusted appearing skin) present. Neurological:      Mental Status: He is alert.          DIAGNOSTIC RESULTS     EKG: All EKG's are interpreted by the Emergency Department Physician who either signs or Co-signs this chart in the absence of acardiologist.        RADIOLOGY:   Non-plain film images such as CT, Ultrasound andMRI are read by the radiologist. Plain radiographic images are visualized and preliminarily interpreted by the emergency physician with the below findings:        Interpretation per the Radiologist below, if available at the time of this note:    No orders to display         ED BEDSIDE ULTRASOUND:   Performed by ED Physician - none    LABS:  Labs Reviewed - No data to display    All other labs were within normal range or not returned as of this dictation. RE-ASSESSMENT           EMERGENCY DEPARTMENT COURSE and DIFFERENTIALDIAGNOSIS/MDM:   Vitals:    Vitals:    05/04/21 1632   BP: (!) 147/73   Pulse: 85   Resp: 16   Temp: 97 °F (36.1 °C)   SpO2: 97%   Weight: 155 lb (70.3 kg)       MDM      CONSULTS:  None    PROCEDURES:  Unless otherwise notedbelow, none     Procedures    FINAL IMPRESSION     1. Dermatitis    2. Chemical exposure    3. Cellulitis of left leg          DISPOSITION/PLAN   DISPOSITION Decision To Discharge 05/04/2021 07:00:19 PM      PATIENT REFERRED TO:  Amairani Larson [id number] [phone]  Schedule an appointment as soon as possible for a visit in 3 days  For wound re-check      DISCHARGE MEDICATIONS:       Discharge Medication List as of 5/4/2021  7:01 PM           Medication List      START taking these medications    cephALEXin 500 MG capsule  Commonly known as: Keflex  Take 1 capsule by mouth 3 times daily for 10 days        ASK your doctor about these medications    ibuprofen 800 MG tablet  Commonly known as: ADVIL;MOTRIN  Take 1 tablet by mouth 3 times daily as needed for Pain     ivermectin 3 MG tablet  Take 4 tabs today and repeat 4 tabs in 2 weeks     permethrin 5 % cream  Commonly known as: ELIMITE  Apply topically as directed- apply chin to toes and wash off in 8-12 hrs, repeat in 1 wk     PREDNISONE PO           Where to Get Your Medications      You can get these medications from any pharmacy    Bring a paper prescription for each of these medications  · cephALEXin 500 MG capsule           (Pleasenote that portions of this note were completed with a voice recognition program.  Efforts were made to edit the dictations but occasionally words are mis-transcribed.) Mary Anthony, APRN  05/05/21 8584

## 2024-01-17 ENCOUNTER — OFFICE VISIT (OUTPATIENT)
Age: 24
End: 2024-01-17
Payer: COMMERCIAL

## 2024-01-17 VITALS
DIASTOLIC BLOOD PRESSURE: 70 MMHG | OXYGEN SATURATION: 100 % | HEART RATE: 95 BPM | SYSTOLIC BLOOD PRESSURE: 118 MMHG | RESPIRATION RATE: 20 BRPM | TEMPERATURE: 98.1 F | HEIGHT: 73 IN | WEIGHT: 156 LBS | BODY MASS INDEX: 20.67 KG/M2

## 2024-01-17 DIAGNOSIS — R05.1 ACUTE COUGH: ICD-10-CM

## 2024-01-17 DIAGNOSIS — J02.9 SORE THROAT: ICD-10-CM

## 2024-01-17 DIAGNOSIS — J06.9 ACUTE UPPER RESPIRATORY INFECTION: Primary | ICD-10-CM

## 2024-01-17 LAB
INFLUENZA A ANTIBODY: NEGATIVE
INFLUENZA B ANTIBODY: NEGATIVE
S PYO AG THROAT QL: NORMAL
SARS-COV-2 N GENE RESP QL NAA+PROBE: NOT DETECTED

## 2024-01-17 PROCEDURE — 99214 OFFICE O/P EST MOD 30 MIN: CPT

## 2024-01-17 PROCEDURE — 96372 THER/PROPH/DIAG INJ SC/IM: CPT

## 2024-01-17 RX ORDER — DEXAMETHASONE SODIUM PHOSPHATE 10 MG/ML
10 INJECTION INTRAMUSCULAR; INTRAVENOUS ONCE
Status: COMPLETED | OUTPATIENT
Start: 2024-01-17 | End: 2024-01-17

## 2024-01-17 RX ORDER — BROMPHENIRAMINE MALEATE, PSEUDOEPHEDRINE HYDROCHLORIDE, AND DEXTROMETHORPHAN HYDROBROMIDE 2; 30; 10 MG/5ML; MG/5ML; MG/5ML
5-10 SYRUP ORAL 4 TIMES DAILY PRN
Qty: 200 ML | Refills: 0 | Status: SHIPPED | OUTPATIENT
Start: 2024-01-17 | End: 2024-01-22

## 2024-01-17 RX ADMIN — DEXAMETHASONE SODIUM PHOSPHATE 10 MG: 10 INJECTION INTRAMUSCULAR; INTRAVENOUS at 08:05

## 2024-01-17 ASSESSMENT — ENCOUNTER SYMPTOMS
FACIAL SWELLING: 0
SINUS PAIN: 0
NAUSEA: 0
CHEST TIGHTNESS: 0
CHOKING: 0
EYE PAIN: 0
TROUBLE SWALLOWING: 0
ABDOMINAL PAIN: 0
VOMITING: 0
COLOR CHANGE: 0
SORE THROAT: 1
DIARRHEA: 0
SINUS PRESSURE: 1
EYE DISCHARGE: 0
STRIDOR: 0
COUGH: 1
APNEA: 0
WHEEZING: 0
EYE REDNESS: 0
CONSTIPATION: 0
ABDOMINAL DISTENTION: 0
SHORTNESS OF BREATH: 0

## 2024-01-17 NOTE — PROGRESS NOTES
Medication was administered by Bre Storm MA at 8:05 AM.    Medication: dexamethasone  Amount: 10mg  Route: intramuscular  Site: Dorsogluteal right    Patient tolerated well.  
MG/5ML syrup Take 5-10 mLs by mouth 4 times daily as needed for Cough 200 mL 0    ivermectin 3 MG tablet Take 4 tabs today and repeat 4 tabs in 2 weeks (Patient not taking: Reported on 1/17/2024) 8 tablet 0    PREDNISONE PO Take by mouth (Patient not taking: Reported on 1/17/2024)      permethrin (ELIMITE) 5 % cream Apply topically as directed- apply chin to toes and wash off in 8-12 hrs, repeat in 1 wk (Patient not taking: Reported on 1/17/2024) 1 Tube 1    ibuprofen (ADVIL;MOTRIN) 800 MG tablet Take 1 tablet by mouth 3 times daily as needed for Pain (Patient not taking: Reported on 12/28/2020) 30 tablet 0     Current Facility-Administered Medications   Medication Dose Route Frequency Provider Last Rate Last Admin    dexAMETHasone (DECADRON) injection 10 mg  10 mg IntraMUSCular Once Doc, Margarita-Gabo, APRN - CNP           Allergies   Allergen Reactions    Amoxicillin-Pot Clavulanate        Health Maintenance   Topic Date Due    COVID-19 Vaccine (1) Never done    Hepatitis B vaccine (3 of 3 - 3-dose series) 02/22/2002    Varicella vaccine (2 of 2 - 2-dose childhood series) 12/25/2004    Pneumococcal 0-64 years Vaccine (1 - PCV) 12/25/2006    HPV vaccine (1 - Male 2-dose series) Never done    Depression Screen  Never done    HIV screen  Never done    Hepatitis C screen  Never done    Flu vaccine (1) Never done    DTaP/Tdap/Td vaccine (7 - Td or Tdap) 01/05/2032    Hib vaccine  Completed    Polio vaccine  Completed    Hepatitis A vaccine  Aged Out    Meningococcal (ACWY) vaccine  Aged Out       Subjective:   Review of Systems   Constitutional:  Negative for activity change, appetite change, chills, diaphoresis, fatigue, fever and unexpected weight change.   HENT:  Positive for congestion, postnasal drip, sinus pressure and sore throat. Negative for dental problem, ear discharge, ear pain, facial swelling, hearing loss, sinus pain and trouble swallowing.    Eyes:  Negative for pain, discharge, redness and visual

## 2024-01-17 NOTE — PATIENT INSTRUCTIONS
Strep and flu tests were negative    COVID test sent to lab. We will call with results.     Dexamethasone 10 mg injection given today in office    Bromfed as needed for cough    Increase fluid intake    Recommended OTC mucinex     May use OTC claritin/zyrtec and nasal spray such as flonase to help symptoms    If patient is not improving or developing any new/worsening symptoms then return to clinic or f/u with PCP    Any condition can change, despite proper treatment. Therefore, if symptoms still persist or worsen after treatment plan intitated today, either go to the nearest ER, or call PCP, or return to UC for further evaluation.    Urgent Care evaluation today is not a substitute for PCP visit. Follow up care is your responsibility to discuss and review this UC visit.